# Patient Record
Sex: FEMALE | Race: WHITE | NOT HISPANIC OR LATINO | Employment: FULL TIME | URBAN - METROPOLITAN AREA
[De-identification: names, ages, dates, MRNs, and addresses within clinical notes are randomized per-mention and may not be internally consistent; named-entity substitution may affect disease eponyms.]

---

## 2021-08-12 ENCOUNTER — HOSPITAL ENCOUNTER (OUTPATIENT)
Dept: ULTRASOUND IMAGING | Facility: CLINIC | Age: 51
Discharge: HOME | End: 2021-08-12
Payer: COMMERCIAL

## 2021-08-12 ENCOUNTER — HOSPITAL ENCOUNTER (OUTPATIENT)
Dept: MAMMOGRAPHY | Facility: CLINIC | Age: 51
Discharge: HOME | End: 2021-08-12
Payer: COMMERCIAL

## 2021-08-12 VITALS — BODY MASS INDEX: 26.52 KG/M2 | HEIGHT: 66 IN | WEIGHT: 165 LBS

## 2021-08-12 DIAGNOSIS — N63.20 UNSPECIFIED LUMP IN THE LEFT BREAST, UNSPECIFIED QUADRANT: ICD-10-CM

## 2021-08-12 DIAGNOSIS — N63.23 UNSPECIFIED LUMP IN THE LEFT BREAST, LOWER OUTER QUADRANT: ICD-10-CM

## 2021-08-12 DIAGNOSIS — N63.24 BREAST LUMP ON LEFT SIDE AT 7 O'CLOCK POSITION: ICD-10-CM

## 2021-08-12 PROCEDURE — 76642 ULTRASOUND BREAST LIMITED: CPT

## 2021-08-12 PROCEDURE — 77066 DX MAMMO INCL CAD BI: CPT

## 2021-08-12 PROCEDURE — G0279 TOMOSYNTHESIS, MAMMO: HCPCS

## 2022-08-30 ENCOUNTER — OFFICE VISIT (OUTPATIENT)
Dept: OBGYN CLINIC | Facility: CLINIC | Age: 52
End: 2022-08-30
Payer: COMMERCIAL

## 2022-08-30 VITALS
SYSTOLIC BLOOD PRESSURE: 122 MMHG | WEIGHT: 168.8 LBS | HEIGHT: 66 IN | BODY MASS INDEX: 27.13 KG/M2 | DIASTOLIC BLOOD PRESSURE: 80 MMHG

## 2022-08-30 DIAGNOSIS — A64 STD (FEMALE): ICD-10-CM

## 2022-08-30 DIAGNOSIS — R92.8 ABNORMAL MAMMOGRAM: ICD-10-CM

## 2022-08-30 DIAGNOSIS — Z12.4 CERVICAL CANCER SCREENING: ICD-10-CM

## 2022-08-30 DIAGNOSIS — Z01.419 ENCOUNTER FOR ANNUAL ROUTINE GYNECOLOGICAL EXAMINATION: Primary | ICD-10-CM

## 2022-08-30 DIAGNOSIS — Z11.51 SCREENING FOR HPV (HUMAN PAPILLOMAVIRUS): ICD-10-CM

## 2022-08-30 PROCEDURE — G0476 HPV COMBO ASSAY CA SCREEN: HCPCS | Performed by: OBSTETRICS & GYNECOLOGY

## 2022-08-30 PROCEDURE — 87491 CHLMYD TRACH DNA AMP PROBE: CPT | Performed by: OBSTETRICS & GYNECOLOGY

## 2022-08-30 PROCEDURE — G0145 SCR C/V CYTO,THINLAYER,RESCR: HCPCS | Performed by: OBSTETRICS & GYNECOLOGY

## 2022-08-30 PROCEDURE — 87591 N.GONORRHOEAE DNA AMP PROB: CPT | Performed by: OBSTETRICS & GYNECOLOGY

## 2022-08-30 PROCEDURE — 0503F POSTPARTUM CARE VISIT: CPT | Performed by: OBSTETRICS & GYNECOLOGY

## 2022-08-30 PROCEDURE — 99386 PREV VISIT NEW AGE 40-64: CPT | Performed by: OBSTETRICS & GYNECOLOGY

## 2022-08-30 RX ORDER — GABAPENTIN 300 MG/1
300 CAPSULE ORAL 3 TIMES DAILY
COMMUNITY
Start: 2022-08-06

## 2022-08-30 RX ORDER — BUPROPION HYDROCHLORIDE 150 MG/1
TABLET ORAL
COMMUNITY
Start: 2022-08-27

## 2022-08-30 NOTE — PROGRESS NOTES
Assessment/Plan:    Pap and HPV done today    Chlamydia and gonorrhea done     I reviewed the diagnostic mammogram and ultrasound with her  I ordered a bilateral diagnostic mammogram and a right breast ultrasound  Discussed self breast exams    colon cancer screening  - negative Cologuard  earlier this month  Perimenopause- I reviewed symptoms with which she should call  I recommended that she keep careful track of her cycles and if she has prolonged heavy bleeding again, she must call  UTI symptoms- I believe these are related to her frequent intercourse, we discussed lubricant  She has had negative urinalysis  She feels the Ebb Comp works well for her    discussed preventive care, regular exercise and a healthy diet      She has an appointment in October for a menopause visit  No problem-specific Assessment & Plan notes found for this encounter  Diagnoses and all orders for this visit:    Encounter for annual routine gynecological examination    Abnormal mammogram  -     US breast right limited (diagnostic); Future  -     Mammo diagnostic bilateral w 3d & cad; Future    Cervical cancer screening    Screening for HPV (human papillomavirus)    STD (female)    Other orders  -     buPROPion (WELLBUTRIN XL) 150 mg 24 hr tablet  -     gabapentin (NEURONTIN) 300 mg capsule; Take 300 mg by mouth 3 (three) times a day          Subjective:      Patient ID: Destinee Macedo is a 47 y o  female  New patient- 11-year-old female presents for yearly  She was seen last year at [facility] and had a Paragard IUD placed  She is very frequently sexually active and has UTI symptoms, taking Ucora  Last November had a normal cycle, skipped 3 months and had spotting, migraines, bled for 1 month in March,  April and May light cycles and then light bleeding in August  Migraines are fairly new, sounds like she has aura  Night sweats are fairly mild        She had a diagnostic mammogram last August that showed what appeared to be duct ectasia in the right breast and was due to have a repeat study in 6 months  It does not appear that this was done  She takes gabapentin for neuropathy, cause is unknown  No pregnancies  H/o HSV, incidental finding in STD screening, never had an outbreak      The following portions of the patient's history were reviewed and updated as appropriate: allergies, current medications, past family history, past medical history, past social history, past surgical history and problem list     Review of Systems   Constitutional: Negative  Gastrointestinal: Negative  Endocrine: Positive for heat intolerance  Genitourinary: Negative  Objective: There were no vitals taken for this visit  Physical Exam  Vitals reviewed  Constitutional:       Appearance: She is well-developed  Neck:      Thyroid: No thyromegaly  Trachea: No tracheal deviation  Cardiovascular:      Rate and Rhythm: Normal rate and regular rhythm  Pulmonary:      Effort: Pulmonary effort is normal       Breath sounds: Normal breath sounds  Chest:   Breasts: Breasts are symmetrical       Right: No inverted nipple, mass, nipple discharge, skin change or tenderness  Left: No inverted nipple, mass, nipple discharge, skin change or tenderness  Abdominal:      General: There is no distension  Palpations: Abdomen is soft  There is no mass  Tenderness: There is no abdominal tenderness  Genitourinary:     Labia:         Right: No rash, tenderness, lesion or injury  Left: No rash, tenderness, lesion or injury  Vagina: Normal       Cervix: No cervical motion tenderness, discharge or friability  Adnexa:         Right: No mass, tenderness or fullness  Left: No mass, tenderness or fullness          Rectum: Normal

## 2022-08-31 LAB
HPV HR 12 DNA CVX QL NAA+PROBE: NEGATIVE
HPV16 DNA CVX QL NAA+PROBE: NEGATIVE
HPV18 DNA CVX QL NAA+PROBE: NEGATIVE

## 2022-09-01 LAB
C TRACH DNA SPEC QL NAA+PROBE: NEGATIVE
N GONORRHOEA DNA SPEC QL NAA+PROBE: NEGATIVE

## 2022-09-02 LAB
LAB AP GYN PRIMARY INTERPRETATION: NORMAL
Lab: NORMAL
PATH INTERP SPEC-IMP: NORMAL

## 2022-10-10 ENCOUNTER — OFFICE VISIT (OUTPATIENT)
Dept: GYNECOLOGY | Facility: CLINIC | Age: 52
End: 2022-10-10
Payer: COMMERCIAL

## 2022-10-10 VITALS
DIASTOLIC BLOOD PRESSURE: 78 MMHG | SYSTOLIC BLOOD PRESSURE: 112 MMHG | WEIGHT: 169.8 LBS | HEIGHT: 66 IN | BODY MASS INDEX: 27.29 KG/M2

## 2022-10-10 DIAGNOSIS — N92.6 IRREGULAR MENSES: ICD-10-CM

## 2022-10-10 DIAGNOSIS — R00.2 HEART PALPITATIONS: ICD-10-CM

## 2022-10-10 DIAGNOSIS — N95.1 PERIMENOPAUSE: Primary | ICD-10-CM

## 2022-10-10 DIAGNOSIS — R61 NIGHT SWEATS: ICD-10-CM

## 2022-10-10 PROCEDURE — 99215 OFFICE O/P EST HI 40 MIN: CPT | Performed by: OBSTETRICS & GYNECOLOGY

## 2022-10-10 NOTE — PROGRESS NOTES
Assessment/Plan:     I have spent 41 minutes with Patient  today in which greater than 50% of this time was spent in counseling/coordination of care regarding Risks and benefits of tx options for perimenopause  Night sweats/hot flashes - we discussed hot flashes in great detail  We reviewed conservative options to manage this such as exercise, avoiding triggers of hot flashes, and dietary changes  We discussed over-the-counter treatments such as soy or black cohosh  We reviewed prescription medications including SSRI antidepressants, Brisdelle and hormone replacement therapy  She is currently on gabapentin, which often decreased hot flashes  She is on Wellbutrin also  Side effects and risks reviewed  for these options for SSRI, Brisdelle and HRT  Briefly discussed use of low-dose transdermal estrogen with oral micronized progesterone  She is going to explore conservative options until we evaluate her spotting and her IUD placement  Postcoital spotting and midcycle spotting-I recommended sonohysterogram to rule out polyp and also to confirm IUD placement  She feels that her strings are longer recently  She had a normal Pap and negative HPV in August   She scheduled sonohysterogram    Contraception-ultrasound will be done to confirm IUD placement  I recommended that she use condoms until this is confirmed  Sleep difficulty-this most likely is related to her night sweats and is contributing to her fatigue during the day  I did give her some information on sleep hygiene  She has not used melatonin  The gabapentin was helping her with sleep but she does not feel that it is working as well now  Palpitations - I explained that this may be from menopause however it should be evaluated  She was in the ER in 2020 and had some evaluation there  This was in [place]  She was given information to review for all of the above including hot flashes, sleep difficulties and vaginal dryness      I referred her to family practice so she could establish care and have her palpitations evaluated  They could possibly manage her Wellbutrin also  There are no diagnoses linked to this encounter  Subjective:     Patient ID: Charla Mohamud is a 47 y o  female  Menopause Visit    47year old female presents to discuss menopause and perimenopause  She was just here in August for a yearly exam   She has been skipping cycles  The longest she has gone is 90 days  Her last menstrual cycle was September 3rd  She has been having some postcoital spotting about 30% of the time, it is very light and sometimes only lasts a few hours  She sometimes has spotting in between cycles not related to intercourse  She never had this prior to having her IUD  She had a ParaGard placed July 2021  Recently, she notes that the string is longer  She has been having night sweats that are very bothersome to her  They wake her from sleep and most likely because of this, she is very tired during the day and feels that she cannot focus  Less frequently are hot flashes that occur during the day but she does not feel these are as severe and they are manageable  She also gets palpitations  This has been happening for 5 years  She went to the ER in 2020 but has not had a formal evaluation  She does take Neurontin for neuropathy of unknown etiology and Wellbutrin for depression  Both these have worked well for her although she feels that they do not work quite as well as in the past     She has noticed some slight vaginal dryness but overall does not have discomfort with intercourse  Review of Systems   Constitutional: Positive for fatigue  Gastrointestinal: Negative  Endocrine: Positive for heat intolerance  Genitourinary: Negative  Psychiatric/Behavioral: Positive for sleep disturbance           Objective:     Physical Exam

## 2022-10-20 ENCOUNTER — ULTRASOUND (OUTPATIENT)
Dept: GYNECOLOGY | Facility: CLINIC | Age: 52
End: 2022-10-20
Payer: COMMERCIAL

## 2022-10-20 ENCOUNTER — PROCEDURE VISIT (OUTPATIENT)
Dept: GYNECOLOGY | Facility: CLINIC | Age: 52
End: 2022-10-20
Payer: COMMERCIAL

## 2022-10-20 VITALS
SYSTOLIC BLOOD PRESSURE: 134 MMHG | WEIGHT: 169 LBS | DIASTOLIC BLOOD PRESSURE: 84 MMHG | BODY MASS INDEX: 27.16 KG/M2 | HEIGHT: 66 IN

## 2022-10-20 DIAGNOSIS — N92.6 IRREGULAR MENSES: Primary | ICD-10-CM

## 2022-10-20 DIAGNOSIS — N93.0 POSTCOITAL BLEEDING: Primary | ICD-10-CM

## 2022-10-20 LAB — SL AMB POCT URINE HCG: NORMAL

## 2022-10-20 PROCEDURE — 58340 CATHETER FOR HYSTEROGRAPHY: CPT | Performed by: OBSTETRICS & GYNECOLOGY

## 2022-10-20 PROCEDURE — 81025 URINE PREGNANCY TEST: CPT | Performed by: OBSTETRICS & GYNECOLOGY

## 2022-10-20 PROCEDURE — 76831 ECHO EXAM UTERUS: CPT | Performed by: OBSTETRICS & GYNECOLOGY

## 2022-10-20 PROCEDURE — 76830 TRANSVAGINAL US NON-OB: CPT | Performed by: OBSTETRICS & GYNECOLOGY

## 2022-10-20 PROCEDURE — 58100 BIOPSY OF UTERUS LINING: CPT | Performed by: OBSTETRICS & GYNECOLOGY

## 2022-10-20 PROCEDURE — 99213 OFFICE O/P EST LOW 20 MIN: CPT | Performed by: OBSTETRICS & GYNECOLOGY

## 2022-10-20 NOTE — PROGRESS NOTES
Assessment/Plan:     Irregular bleeding - SIS is normal, emb done without difficulty  Will await results  If normal, discussed observation vs controlling her bleeding with progesterone if needed  Pt is agreeable  There are no diagnoses linked to this encounter  Subjective:     Patient ID: Collin Quintero is a 47 y o  female  Patient presents for sonohysterogram   She is having postcoital bleeding and midcycle bleeding  She does have a ParaGard IUD  SIS shows the Paragard in good position  No filling defect, normal ovaries  Review of Systems   Constitutional: Negative  Gastrointestinal: Negative  Genitourinary: Positive for menstrual problem  Objective:     Physical Exam  Constitutional:       Appearance: She is well-developed  Neck:      Thyroid: No thyromegaly  Trachea: No tracheal deviation  Chest:   Breasts: Breasts are symmetrical       Right: No inverted nipple, mass, nipple discharge, skin change or tenderness  Left: No inverted nipple, mass, nipple discharge, skin change or tenderness  Genitourinary:     Labia:         Right: No rash, tenderness, lesion or injury  Left: No rash, tenderness, lesion or injury  Vagina: Normal       Cervix: No cervical motion tenderness, discharge or friability  Uterus: Normal        Adnexa:         Right: No mass, tenderness or fullness  Left: No mass, tenderness or fullness

## 2022-10-20 NOTE — PROGRESS NOTES
AMB US Pelvic Non OB    Date/Time: 10/20/2022 8:15 AM  Performed by: Vasu Borrego  Authorized by: Renzo Hogan DO   Universal Protocol:  Patient identity confirmed: verbally with patient      Procedure details:     Technique:  Transvaginal US, Non-OB    Position: lithotomy exam    Uterine findings:     Length (cm): 6 08    Height (cm):  3 09    Width (cm):  4 26    Endometrial stripe: identified      Endometrium thickness (mm):  6 1  Left ovary findings:     Left ovary:  Visualized    Length (cm): 2 55    Height (cm): 1 22    Width (cm): 1 48  Right ovary findings:     Right ovary:  Visualized    Length (cm): 2 88    Height (cm): 1 64    Width (cm): 1 76  Other findings:     Free pelvic fluid: not identified      Free peritoneal fluid: not identified    Post-Procedure Details:     Impression:  Multi-position uterus demonstrates an IUD in good position within the endometrium  The uterus and bilateral ovaries appear within normal limits  No free fluid  Tolerance: Tolerated well, no immediate complications    Complications: no complications    Additional Procedure Comments:      PlayData F8 E8C-RS transvaginal transducer Serial # [id number] was used to perform the examination today and subsequently followed with high level disinfection utilizing Trophon EPR procedure  Ultrasound performed at:     [id number] [address]  Phone:  [phone]  Fax:  [phone]    Date/Time: 10/20/2022 8:17 AM  Performed by: Renzo Hogan DO  Authorized by:  Renzo Hogan DO   Universal Protocol:  Patient identity confirmed: verbally with patient      Pre-procedure:     Prepped with: Hibiclens    Procedure:     Cervix cleaned and prepped: yes      Uterus sounded: yes      Catheter inserted: yes      Uterine cavity distended with saline: yes    Post-procedure:     Patient observed: yes      Post procedure instructions given to patient: yes      Patient tolerated procedure well with no complications: yes    Comments:      Sonohysterogram demonstrates an IUD within the endometrium without distinct polyps or submucosal fibroid  Endometrial biopsy    Date/Time: 10/20/2022 8:17 AM  Performed by: Evelyn Alvarado DO  Authorized by:  Evelyn Alvarado DO   Universal Protocol:  Patient identity confirmed: verbally with patient      Procedure:     Procedure: endometrial biopsy with Pipelle      A bivalve speculum was placed in the vagina: yes      Cervix cleaned and prepped: yes      Specimen collected: specimen collected and sent to pathology      Patient tolerated procedure well with no complications: yes

## 2022-11-01 RX ORDER — VIT C/B6/B5/MAGNESIUM/HERB 173 50-5-6-5MG
CAPSULE ORAL
COMMUNITY

## 2022-11-01 RX ORDER — NICOTINE 14MG/24HR
PATCH, TRANSDERMAL 24 HOURS TRANSDERMAL EVERY 12 HOURS
COMMUNITY
End: 2023-03-28 | Stop reason: HOSPADM

## 2022-11-02 ENCOUNTER — OFFICE VISIT (OUTPATIENT)
Dept: FAMILY MEDICINE CLINIC | Facility: CLINIC | Age: 52
End: 2022-11-02

## 2022-11-02 VITALS
DIASTOLIC BLOOD PRESSURE: 92 MMHG | SYSTOLIC BLOOD PRESSURE: 128 MMHG | OXYGEN SATURATION: 99 % | HEIGHT: 66 IN | BODY MASS INDEX: 27.51 KG/M2 | RESPIRATION RATE: 14 BRPM | WEIGHT: 171.2 LBS | HEART RATE: 80 BPM | TEMPERATURE: 98.1 F

## 2022-11-02 DIAGNOSIS — Z13.1 SCREENING FOR DIABETES MELLITUS: ICD-10-CM

## 2022-11-02 DIAGNOSIS — Z00.00 ANNUAL PHYSICAL EXAM: Primary | ICD-10-CM

## 2022-11-02 DIAGNOSIS — R00.2 PALPITATIONS: ICD-10-CM

## 2022-11-02 DIAGNOSIS — F43.21 SITUATIONAL DEPRESSION: ICD-10-CM

## 2022-11-02 DIAGNOSIS — Z11.4 SCREENING FOR HIV (HUMAN IMMUNODEFICIENCY VIRUS): ICD-10-CM

## 2022-11-02 DIAGNOSIS — Z11.59 ENCOUNTER FOR HEPATITIS C SCREENING TEST FOR LOW RISK PATIENT: ICD-10-CM

## 2022-11-02 DIAGNOSIS — G60.9 PERIPHERAL NEUROPATHY, IDIOPATHIC: ICD-10-CM

## 2022-11-02 DIAGNOSIS — Z13.6 SCREENING FOR CARDIOVASCULAR CONDITION: ICD-10-CM

## 2022-11-02 PROBLEM — G89.4 CHRONIC PAIN SYNDROME: Status: ACTIVE | Noted: 2022-11-02

## 2022-11-02 PROBLEM — M51.27 HERNIATED NUCLEUS PULPOSUS, L5-S1: Status: ACTIVE | Noted: 2022-11-02

## 2022-11-02 RX ORDER — COPPER 313.4 MG/1
1 INTRAUTERINE DEVICE INTRAUTERINE ONCE
COMMUNITY
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
## 2022-11-02 NOTE — PATIENT INSTRUCTIONS

## 2022-11-07 ENCOUNTER — OFFICE VISIT (OUTPATIENT)
Dept: GYNECOLOGY | Facility: CLINIC | Age: 52
End: 2022-11-07

## 2022-11-07 VITALS
BODY MASS INDEX: 27.48 KG/M2 | DIASTOLIC BLOOD PRESSURE: 80 MMHG | WEIGHT: 171 LBS | SYSTOLIC BLOOD PRESSURE: 138 MMHG | HEIGHT: 66 IN

## 2022-11-07 DIAGNOSIS — N92.6 IRREGULAR MENSES: Primary | ICD-10-CM

## 2022-11-07 DIAGNOSIS — N93.0 POSTCOITAL BLEEDING: ICD-10-CM

## 2022-11-07 NOTE — PROGRESS NOTES
Assessment/Plan:     Endometrial polyp - this is apparently very small as it was not seen on the SIS  I explained that typically polyps are removed when causing bleeding as it is with her but some of her bleeding may be from perimenopause  I believe removing the polyp will help with her midcycle bleeding but she cold still have some irregularity  She would like to proceed with D&C  D&C, hysteroscopy reviewed with pt in detail  Will need to remove her paragard and then reinsert it at end of procedure  I explained that it could be damaged during removal although this is unlikely  Risks reviewed including bleeding, infection, 1% risk of uterine perforation with rare risk of injury to surrounding structures and need for further surgery  Post op course reviewed in detail as well including activity restrictions  Pt's questions were answered  Consent reviewed and signed  Pt to schedule  She wants to wait until January as she is starting a new job  Will call with prolonged or heavy bleeding, and we can order progesterone until her procedure  There are no diagnoses linked to this encounter  Subjective:     Patient ID: Jasmin Momin is a 47 y o  female  Pt here for discussion  She had an EMB that showed a fragment of an endometrial polyp and disordered proliferative endometrium  SIS showed a smooth endometrium  This was done due to post coital bleeding and occasional midcycle bleeding  She has a Paragard  LMP 9-3-22, this was her last regular menstrual cycle  I explained that polyps typically cause midcycle bleeding or spotting  She is also most likely perimenopausal which could be contributing to her symptoms        Review of Systems      Objective:     Physical Exam

## 2022-11-18 ENCOUNTER — TELEPHONE (OUTPATIENT)
Dept: FAMILY MEDICINE CLINIC | Facility: CLINIC | Age: 52
End: 2022-11-18

## 2022-11-18 NOTE — TELEPHONE ENCOUNTER
Spoke with the patient and relayed Dr Fadia Peterson' recommendations  Patient does not want to go to the ER - She states that she can not drive  Asked if she has anyone in her life  - family, friends, neighbors, etc,- who could drive her to the ER  If not, she could call for an ambulance  Patient reports that she could likely get a ride from her sister, but still does not want to go because she doesn't know what they would do for her, she feels they would just refer her to a specialist  I explained that's about as much as we would be able to do for her here at the office, but at least at the hospital, they could likely get her breathing better  Patient is still dissatisfied, stating she doesn't want to be put on pain meds and she doesn't want that  I assured her that the hospital would not force her to take anything that she doesn't want to take and they'd likely be able to discuss treatment options that better suit her needs  Patient still does not seem convinced that going to the ER is what's best for her, I advised that she present to the ER before symptoms become more severe  Patient will consider  She has no further questions or concerns to be addressed at this time

## 2022-11-18 NOTE — TELEPHONE ENCOUNTER
Patient is calling stating that she had hernia disc before and she is in pain where she can hardly breath  Please let me know what to do with this matter

## 2022-11-22 ENCOUNTER — HOSPITAL ENCOUNTER (OUTPATIENT)
Dept: MAMMOGRAPHY | Facility: CLINIC | Age: 52
Discharge: HOME | End: 2022-11-22

## 2022-11-22 ENCOUNTER — HOSPITAL ENCOUNTER (OUTPATIENT)
Dept: ULTRASOUND IMAGING | Facility: CLINIC | Age: 52
Discharge: HOME | End: 2022-11-22

## 2022-11-22 DIAGNOSIS — R92.8 ABNORMAL MAMMOGRAM: ICD-10-CM

## 2022-11-22 DIAGNOSIS — R92.8 ABNORMAL MAMMOGRAM: Primary | ICD-10-CM

## 2022-12-15 ENCOUNTER — TELEPHONE (OUTPATIENT)
Dept: GYNECOLOGY | Facility: CLINIC | Age: 52
End: 2022-12-15

## 2022-12-15 NOTE — TELEPHONE ENCOUNTER
----- Message from Trev Porter sent at 12/14/2022 11:07 AM EST -----  Regarding: Surgery  I spoke to Li Kim she states she will be getting new insurance and does not know yet what the coverage will be she will call our office back once she gets the insurance to reschedule this surgery  Patient also stated she has not had bleeding in 6 weeks and was not sure if she will even need the surgery

## 2022-12-15 NOTE — TELEPHONE ENCOUNTER
----- Message from Lord Boy DO sent at 12/15/2022  7:28 AM EST -----  Regarding: RE: Surgery  Ok, her polyp was very small and if she has bleeding we may be able to give her some short term progesterone to help  Will see how she does  thanks  ----- Message -----  From: Marquis Oren MA  Sent: 12/14/2022  11:09 AM EST  To: Lord Boy DO  Subject: Surgery                                          I spoke to Jeffery Juárez she states she will be getting new insurance and does not know yet what the coverage will be she will call our office back once she gets the insurance to reschedule this surgery  Patient also stated she has not had bleeding in 6 weeks and was not sure if she will even need the surgery

## 2023-01-16 ENCOUNTER — HOSPITAL ENCOUNTER (OUTPATIENT)
Dept: NON INVASIVE DIAGNOSTICS | Facility: HOSPITAL | Age: 53
Discharge: HOME | End: 2023-01-16

## 2023-01-16 VITALS
WEIGHT: 171 LBS | DIASTOLIC BLOOD PRESSURE: 80 MMHG | BODY MASS INDEX: 27.48 KG/M2 | SYSTOLIC BLOOD PRESSURE: 138 MMHG | HEIGHT: 66 IN | HEART RATE: 74 BPM

## 2023-01-16 DIAGNOSIS — R00.2 PALPITATIONS: ICD-10-CM

## 2023-01-16 LAB
AORTIC ROOT: 3 CM
AORTIC VALVE MEAN VELOCITY: 9 M/S
APICAL FOUR CHAMBER EJECTION FRACTION: 59 %
AV LVOT MEAN GRADIENT: 2 MMHG
AV LVOT PEAK GRADIENT: 4 MMHG
AV MEAN GRADIENT: 4 MMHG
AV PEAK GRADIENT: 7 MMHG
DOP CALC AO VTI: 27.5 CM
DOP CALC LVOT PEAK VEL VTI: 20.9 CM
DOP CALC LVOT PEAK VEL: 1.03 M/S
DOP CALC MV VTI: 28.3 CM
E WAVE DECELERATION TIME: 199 MS
FRACTIONAL SHORTENING: 33 % (ref 28–44)
GLOBAL LONGITUIDAL STRAIN: -21 %
INTERVENTRICULAR SEPTUM IN DIASTOLE (PARASTERNAL SHORT AXIS VIEW): 0.8 CM
INTERVENTRICULAR SEPTUM: 0.8 CM (ref 0.6–1.1)
IVC: 22 MM
LA/AORTA RATIO 2D: 1.2
LAAS-AP2: 15.3 CM2
LAAS-AP4: 13.8 CM2
LEFT ATRIUM SIZE: 3.6 CM
LEFT INTERNAL DIMENSION IN SYSTOLE: 3.4 CM (ref 2.1–4)
LEFT VENTRICLE DIASTOLIC VOLUME (MOD BIPLANE): 90 ML
LEFT VENTRICLE SYSTOLIC VOLUME (MOD BIPLANE): 36 ML
LEFT VENTRICULAR INTERNAL DIMENSION IN DIASTOLE: 5.1 CM (ref 3.5–6)
LEFT VENTRICULAR POSTERIOR WALL IN END DIASTOLE: 0.8 CM
LEFT VENTRICULAR STROKE VOLUME: 75 ML
LV EF: 60 %
LVSV (TEICH): 75 ML
MV E'TISSUE VEL-LAT: 10 CM/S
MV E'TISSUE VEL-SEP: 8 CM/S
MV MEAN GRADIENT: 1 MMHG
MV PEAK A VEL: 0.73 M/S
MV PEAK E VEL: 98 CM/S
MV PEAK GRADIENT: 4 MMHG
MV STENOSIS PRESSURE HALF TIME: 58 MS
MV VALVE AREA P 1/2 METHOD: 3.79 CM2
RA PRESSURE ESTIMATED: 8 MMHG
RIGHT VENTRICLE ID DIMENSION: 3.1 CM
RV PSP: 26 MMHG
SL CV LV EF: 60
SL CV PED ECHO LEFT VENTRICLE DIASTOLIC VOLUME (MOD BIPLANE) 2D: 122 ML
SL CV PED ECHO LEFT VENTRICLE SYSTOLIC VOLUME (MOD BIPLANE) 2D: 47 ML
TR MAX PG: 18 MMHG
TR PEAK VELOCITY: 2.1 M/S
TRICUSPID ANNULAR PLANE SYSTOLIC EXCURSION: 1.7 CM
TRICUSPID VALVE PEAK REGURGITATION VELOCITY: 2.11 M/S

## 2023-03-29 ENCOUNTER — OFFICE VISIT (OUTPATIENT)
Dept: FAMILY MEDICINE CLINIC | Facility: CLINIC | Age: 53
End: 2023-03-29

## 2023-03-29 VITALS
SYSTOLIC BLOOD PRESSURE: 134 MMHG | DIASTOLIC BLOOD PRESSURE: 84 MMHG | HEART RATE: 68 BPM | TEMPERATURE: 97.6 F | WEIGHT: 175 LBS | BODY MASS INDEX: 28.12 KG/M2 | OXYGEN SATURATION: 96 % | RESPIRATION RATE: 14 BRPM | HEIGHT: 66 IN

## 2023-03-29 DIAGNOSIS — F43.21 SITUATIONAL DEPRESSION: ICD-10-CM

## 2023-03-29 DIAGNOSIS — G60.9 PERIPHERAL NEUROPATHY, IDIOPATHIC: Primary | ICD-10-CM

## 2023-03-29 RX ORDER — BUPROPION HYDROCHLORIDE 300 MG/1
300 TABLET ORAL EVERY MORNING
Qty: 90 TABLET | Refills: 1 | Status: SHIPPED | OUTPATIENT
Start: 2023-03-29

## 2023-03-29 RX ORDER — GABAPENTIN 300 MG/1
300 CAPSULE ORAL 3 TIMES DAILY
Qty: 90 CAPSULE | Refills: 1 | OUTPATIENT
Start: 2023-03-29

## 2023-03-29 RX ORDER — BUPROPION HYDROCHLORIDE 150 MG/1
150 TABLET ORAL EVERY MORNING
Qty: 30 TABLET | Refills: 1 | OUTPATIENT
Start: 2023-03-29

## 2023-03-29 RX ORDER — GABAPENTIN 300 MG/1
CAPSULE ORAL
Qty: 360 CAPSULE | Refills: 1 | Status: SHIPPED | OUTPATIENT
Start: 2023-03-29
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
## 2023-03-30 NOTE — ASSESSMENT & PLAN NOTE
The patient is having worsening peripheral neuropathy  We are going to increase her gabapentin as ordered to increase the nighttime dose  She will go for the lab work as previously ordered to look for underlying causes  In addition, she is looking into seeing a different neurologist and has a few in mind that she is going to try to schedule with  We will plan on seeing her back in the office as scheduled

## 2023-03-30 NOTE — ASSESSMENT & PLAN NOTE
The patient is having worsening depression symptoms  We are going to titrate up on the dose of her bupropion XL to 300 mg daily to see if this helps with her symptoms  We will continue to monitor her closely  We will see her back in the office as scheduled  I am sending her some information about online behavioral therapy that she can check out as well  We will see her back as scheduled

## 2023-03-31 ENCOUNTER — TELEPHONE (OUTPATIENT)
Dept: PSYCHIATRY | Facility: CLINIC | Age: 53
End: 2023-03-31

## 2023-03-31 NOTE — TELEPHONE ENCOUNTER
Contacted patient in regards to routine referral and placing patient on proper wait list and lvm for patient to contact intake department

## 2023-04-27 ENCOUNTER — RA CDI HCC (OUTPATIENT)
Dept: OTHER | Facility: HOSPITAL | Age: 53
End: 2023-04-27

## 2023-04-27 NOTE — PROGRESS NOTES
[facility]  coding opportunities       Chart reviewed, no opportunity found: CHART REVIEWED, NO OPPORTUNITY FOUND        Patients Insurance        Commercial Insurance: [employer]

## 2023-05-11 ENCOUNTER — TELEPHONE (OUTPATIENT)
Dept: BEHAVIORAL/MENTAL HEALTH CLINIC | Facility: CLINIC | Age: 53
End: 2023-05-11

## 2023-05-11 NOTE — TELEPHONE ENCOUNTER
This writer reached out to Judson Parsons regarding her scheduled [employer] In-Take Assessment, and left a message requesting a call back in relation to this

## 2023-05-15 ENCOUNTER — TELEPHONE (OUTPATIENT)
Dept: FAMILY MEDICINE CLINIC | Facility: CLINIC | Age: 53
End: 2023-05-15

## 2023-05-15 ENCOUNTER — TELEPHONE (OUTPATIENT)
Dept: BEHAVIORAL/MENTAL HEALTH CLINIC | Facility: CLINIC | Age: 53
End: 2023-05-15

## 2023-05-15 NOTE — TELEPHONE ENCOUNTER
This writer reached out to [place] regarding her scheduled [employer] In-Take Assessment, and left a message requesting a call back in relation to this

## 2023-05-15 NOTE — TELEPHONE ENCOUNTER
Patient returned your call  She states she was confused by the message and would like a call back about her appointment      Patient states the best time to reach her tomorrow would be between 9am and 10am       Thank you

## 2023-05-16 ENCOUNTER — TELEPHONE (OUTPATIENT)
Dept: BEHAVIORAL/MENTAL HEALTH CLINIC | Facility: CLINIC | Age: 53
End: 2023-05-16

## 2023-05-16 NOTE — TELEPHONE ENCOUNTER
This writer returned Tank Adames returning a telephone call  Reviewed her [facility] In-Take scheduled for today, and reviewed options with keeping the appointment with a plan for an in-take and referral, verses referral options over the phone  Tank Adames reported that she is looking for a trauma therapist, that is in-person and virtual, in-network with [facility] that possibly does somatic therapy with night and weekend hours  This writer reviewed her chart:     Demographic Information: Jamilah Chen, [address] [facility]  Physical Health Insurance: [address] [place] [address] Street: [facility]     This writer provided Tank Adames with a referral for outpatient trauma therapy at [facility] located @ [address] [place] [address], [address] [place] [address], [address] [place] [phone]  She plans to call and schedule  This writer recommended that she contact her health insurance company for more information on in-network providers for somatic therapy  [facility] apt canceled for today  Tank Adames plans to reach out if in need of any further assistance with referral options and/or service coordination

## 2023-07-06 LAB
ALBUMIN SERPL-MCNC: 4.2 G/DL (ref 3.8–4.9)
ALBUMIN/GLOB SERPL: 2 {RATIO} (ref 1.2–2.2)
ALP SERPL-CCNC: 73 IU/L (ref 44–121)
ALT SERPL-CCNC: 12 IU/L (ref 0–32)
APPEARANCE UR: CLEAR
AST SERPL-CCNC: 16 IU/L (ref 0–40)
BACTERIA URNS QL MICRO: ABNORMAL
BASOPHILS # BLD AUTO: 0.1 X10E3/UL (ref 0–0.2)
BASOPHILS NFR BLD AUTO: 1 %
BILIRUB SERPL-MCNC: 0.4 MG/DL (ref 0–1.2)
BILIRUB UR QL STRIP: NEGATIVE
BUN SERPL-MCNC: 14 MG/DL (ref 6–24)
BUN/CREAT SERPL: 14 (ref 9–23)
CALCIUM SERPL-MCNC: 9.8 MG/DL (ref 8.7–10.2)
CASTS URNS QL MICRO: ABNORMAL /LPF
CHLORIDE SERPL-SCNC: 103 MMOL/L (ref 96–106)
CHOLEST SERPL-MCNC: 212 MG/DL (ref 100–199)
CHOLEST/HDLC SERPL: 2.6 RATIO (ref 0–4.4)
CO2 SERPL-SCNC: 28 MMOL/L (ref 20–29)
COLOR UR: YELLOW
CREAT SERPL-MCNC: 0.99 MG/DL (ref 0.57–1)
EGFR: 68 ML/MIN/1.73
EOSINOPHIL # BLD AUTO: 0.2 X10E3/UL (ref 0–0.4)
EOSINOPHIL NFR BLD AUTO: 2 %
EPI CELLS #/AREA URNS HPF: >10 /HPF (ref 0–10)
ERYTHROCYTE [DISTWIDTH] IN BLOOD BY AUTOMATED COUNT: 12.4 % (ref 11.7–15.4)
EST. AVERAGE GLUCOSE BLD GHB EST-MCNC: 103 MG/DL
GLOBULIN SER-MCNC: 2.1 G/DL (ref 1.5–4.5)
GLUCOSE SERPL-MCNC: 84 MG/DL (ref 70–99)
GLUCOSE UR QL: NEGATIVE
HBA1C MFR BLD: 5.2 % (ref 4.8–5.6)
HCT VFR BLD AUTO: 40.1 % (ref 34–46.6)
HCV AB S/CO SERPL IA: NON REACTIVE
HDLC SERPL-MCNC: 82 MG/DL
HGB BLD-MCNC: 13.4 G/DL (ref 11.1–15.9)
HGB UR QL STRIP: NEGATIVE
HIV 1+2 AB+HIV1 P24 AG SERPL QL IA: NON REACTIVE
IMM GRANULOCYTES # BLD: 0 X10E3/UL (ref 0–0.1)
IMM GRANULOCYTES NFR BLD: 0 %
KETONES UR QL STRIP: NEGATIVE
LDLC SERPL CALC-MCNC: 109 MG/DL (ref 0–99)
LDLC SERPL DIRECT ASSAY-MCNC: 109 MG/DL (ref 0–99)
LEUKOCYTE ESTERASE UR QL STRIP: ABNORMAL
LYMPHOCYTES # BLD AUTO: 3 X10E3/UL (ref 0.7–3.1)
LYMPHOCYTES NFR BLD AUTO: 48 %
MCH RBC QN AUTO: 29.3 PG (ref 26.6–33)
MCHC RBC AUTO-ENTMCNC: 33.4 G/DL (ref 31.5–35.7)
MCV RBC AUTO: 88 FL (ref 79–97)
MICRO URNS: ABNORMAL
MONOCYTES # BLD AUTO: 0.5 X10E3/UL (ref 0.1–0.9)
MONOCYTES NFR BLD AUTO: 7 %
NEUTROPHILS # BLD AUTO: 2.6 X10E3/UL (ref 1.4–7)
NEUTROPHILS NFR BLD AUTO: 42 %
NITRITE UR QL STRIP: NEGATIVE
PH UR STRIP: 6.5 [PH] (ref 5–7.5)
PLATELET # BLD AUTO: 220 X10E3/UL (ref 150–450)
POTASSIUM SERPL-SCNC: 4.2 MMOL/L (ref 3.5–5.2)
PROT SERPL-MCNC: 6.3 G/DL (ref 6–8.5)
PROT UR QL STRIP: NEGATIVE
RBC # BLD AUTO: 4.58 X10E6/UL (ref 3.77–5.28)
RBC #/AREA URNS HPF: ABNORMAL /HPF (ref 0–2)
SL AMB VLDL CHOLESTEROL CALC: 21 MG/DL (ref 5–40)
SODIUM SERPL-SCNC: 141 MMOL/L (ref 134–144)
SP GR UR: 1.02 (ref 1–1.03)
TRIGL SERPL-MCNC: 123 MG/DL (ref 0–149)
TSH SERPL DL<=0.005 MIU/L-ACNC: 1.98 UIU/ML (ref 0.45–4.5)
UROBILINOGEN UR STRIP-ACNC: 0.2 MG/DL (ref 0.2–1)
WBC # BLD AUTO: 6.3 X10E3/UL (ref 3.4–10.8)
WBC #/AREA URNS HPF: ABNORMAL /HPF (ref 0–5)

## 2023-07-11 ENCOUNTER — OFFICE VISIT (OUTPATIENT)
Dept: FAMILY MEDICINE CLINIC | Facility: CLINIC | Age: 53
End: 2023-07-11
Payer: COMMERCIAL

## 2023-07-11 VITALS
RESPIRATION RATE: 14 BRPM | BODY MASS INDEX: 27.45 KG/M2 | TEMPERATURE: 98 F | OXYGEN SATURATION: 97 % | SYSTOLIC BLOOD PRESSURE: 126 MMHG | DIASTOLIC BLOOD PRESSURE: 82 MMHG | WEIGHT: 170.8 LBS | HEART RATE: 79 BPM | HEIGHT: 66 IN

## 2023-07-11 DIAGNOSIS — E78.2 MIXED HYPERLIPIDEMIA: ICD-10-CM

## 2023-07-11 DIAGNOSIS — F43.21 SITUATIONAL DEPRESSION: ICD-10-CM

## 2023-07-11 DIAGNOSIS — M24.152 DEGENERATIVE TEAR OF ACETABULAR LABRUM OF LEFT HIP: ICD-10-CM

## 2023-07-11 DIAGNOSIS — G60.9 PERIPHERAL NEUROPATHY, IDIOPATHIC: ICD-10-CM

## 2023-07-11 DIAGNOSIS — N30.00 ACUTE CYSTITIS WITHOUT HEMATURIA: Primary | ICD-10-CM

## 2023-07-11 DIAGNOSIS — M51.27 HERNIATED NUCLEUS PULPOSUS, L5-S1: ICD-10-CM

## 2023-07-11 DIAGNOSIS — M53.3 SACROILIAC JOINT DYSFUNCTION OF LEFT SIDE: ICD-10-CM

## 2023-07-11 DIAGNOSIS — G89.4 CHRONIC PAIN SYNDROME: ICD-10-CM

## 2023-07-11 PROCEDURE — 99214 OFFICE O/P EST MOD 30 MIN: CPT | Performed by: FAMILY MEDICINE

## 2023-07-11 RX ORDER — NITROFURANTOIN 25; 75 MG/1; MG/1
100 CAPSULE ORAL 2 TIMES DAILY
Qty: 10 CAPSULE | Refills: 0 | Status: SHIPPED | OUTPATIENT
Start: 2023-07-11 | End: 2023-07-16

## 2023-07-11 NOTE — ASSESSMENT & PLAN NOTE
The patient is experiencing urinary symptoms over the last few days and their findings on her UA demonstrating an acute cystitis. We will treat her with Macrobid as ordered. She will call if there is no improvement within 2 to 3 days or if the condition worsens.
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
## 2023-07-11 NOTE — ASSESSMENT & PLAN NOTE
The patient has a mild hyperlipidemia-She has a low calculated ASCVD risk of 1.2%. She does not require statin therapy but is going to work on improving her diet and exercise. We will recheck her lipids again in a year.
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
## 2023-07-11 NOTE — ASSESSMENT & PLAN NOTE
The patient states that her neuropathy is well controlled with the gabapentin and her supplementation. There is no evidence of any abnormalities in her lab work to suggest an underlying cause. She is going to look to establish with a neurologist at some point.

## 2023-07-11 NOTE — ASSESSMENT & PLAN NOTE
The patient feels that the bupropion is helping her depression but she is having increased anxiety related to her job. She is going to look into the therapy suggestions from our behavioral specialist.  She will continue with the current dose of her bupropion.

## 2023-09-09 PROBLEM — N30.00 ACUTE CYSTITIS WITHOUT HEMATURIA: Status: RESOLVED | Noted: 2023-07-11 | Resolved: 2023-09-09

## 2023-09-29 DIAGNOSIS — F43.21 SITUATIONAL DEPRESSION: ICD-10-CM

## 2023-09-29 RX ORDER — BUPROPION HYDROCHLORIDE 300 MG/1
300 TABLET ORAL EVERY MORNING
Qty: 90 TABLET | Refills: 1 | Status: SHIPPED | OUTPATIENT
Start: 2023-09-29

## 2024-06-12 ENCOUNTER — VBI (OUTPATIENT)
Dept: ADMINISTRATIVE | Facility: OTHER | Age: 54
End: 2024-06-12

## 2024-06-15 NOTE — TELEPHONE ENCOUNTER
06/14/24 8:20 PM     Chart reviewed for CRC: Colonoscopy was/were not submitted to the patient's insurance.     Casie Tolentino   [facility]